# Patient Record
Sex: MALE | Race: WHITE | Employment: FULL TIME | URBAN - NONMETROPOLITAN AREA
[De-identification: names, ages, dates, MRNs, and addresses within clinical notes are randomized per-mention and may not be internally consistent; named-entity substitution may affect disease eponyms.]

---

## 2020-12-01 ENCOUNTER — OFFICE VISIT (OUTPATIENT)
Dept: SURGERY | Age: 65
End: 2020-12-01
Payer: COMMERCIAL

## 2020-12-01 VITALS
SYSTOLIC BLOOD PRESSURE: 133 MMHG | BODY MASS INDEX: 40.23 KG/M2 | HEART RATE: 59 BPM | TEMPERATURE: 98.2 F | WEIGHT: 281 LBS | HEIGHT: 70 IN | DIASTOLIC BLOOD PRESSURE: 84 MMHG

## 2020-12-01 PROCEDURE — 99203 OFFICE O/P NEW LOW 30 MIN: CPT | Performed by: SURGERY

## 2020-12-01 RX ORDER — DEXTROMETHORPHAN HYDROBROMIDE AND PROMETHAZINE HYDROCHLORIDE 15; 6.25 MG/5ML; MG/5ML
SYRUP ORAL 4 TIMES DAILY PRN
COMMUNITY

## 2020-12-01 RX ORDER — MONTELUKAST SODIUM 10 MG/1
10 TABLET ORAL NIGHTLY
COMMUNITY

## 2020-12-01 RX ORDER — OLMESARTAN MEDOXOMIL 40 MG/1
1 TABLET ORAL
COMMUNITY

## 2020-12-01 RX ORDER — HYDROCHLOROTHIAZIDE 25 MG/1
25 TABLET ORAL DAILY
COMMUNITY

## 2020-12-01 RX ORDER — ASPIRIN 81 MG/1
1 TABLET, CHEWABLE ORAL
COMMUNITY

## 2020-12-01 NOTE — PROGRESS NOTES
Subjective   Rashad Salinas is a 60 y.o. male who presents today for evaluation of rectal bleeding. Approximately a week to 2 weeks ago patient had a couple days where he states he felt tired and fatigued. He rested throughout most of these 2days but then the following day had a bowel movement and when he finished having bowel movement he noticed that there was blood in the water as well as blood within the stool and was also having blood with wiping. Over the next couple days he continued to have passage of dark stools and blood per rectum but states that it slowly improved. He does report that this morning he had a bowel movement with no blood noted. Because of the bleeding and the fatigue he did go to the ER for evaluation and had lab work done as well as a CT scan. His lab work showed that his hemoglobin was 13.9 and CT did not show any acute abnormalities. He has since followed up with his PCP and was then referred to general surgery for further evaluation. Patient does have history of diverticulitis and in 2013 underwent partial colectomy for perforated diverticulitis with colostomy. Had reversal 6 months later. Unfortunately he did have abdominal hernia which was repaired and then had recurrence and ended up going to [facility] for abdominal wall reconstruction. Last colonoscopy was in 2012 and per patient report he states that that they did find diverticulosis but no other abnormal findings. Denies any abdominal pain but states that he has had a mild burning sensation in the left lower quadrant for the past couple days. Does not seem to be getting any worse and states that it seems to be slowly improving. No reported family history of colon cancer.     Past Medical History:   Diagnosis Date    Hypertension     Sleep apnea        Past Surgical History:   Procedure Laterality Date    APPENDECTOMY      COLECTOMY  2013    Partial colectomy - at [address] [place] [address]  HERNIA REPAIR      Abdominal reconstruction    KNEE SURGERY         Current Outpatient Medications   Medication Sig Dispense Refill    aspirin 81 MG chewable tablet Take 1 tablet by mouth      hydroCHLOROthiazide (HYDRODIURIL) 25 MG tablet Take 25 mg by mouth daily      olmesartan (BENICAR) 40 MG tablet 1 tablet       No current facility-administered medications for this visit. Allergies   Allergen Reactions    Penicillins        History reviewed. No pertinent family history.     Social History     Socioeconomic History    Marital status: Unknown     Spouse name: Not on file    Number of children: Not on file    Years of education: Not on file    Highest education level: Not on file   Occupational History    Not on file   Social Needs    Financial resource strain: Not on file    Food insecurity     Worry: Not on file     Inability: Not on file    Transportation needs     Medical: Not on file     Non-medical: Not on file   Tobacco Use    Smoking status: Never Smoker    Smokeless tobacco: Never Used   Substance and Sexual Activity    Alcohol use: Not Currently    Drug use: Not Currently    Sexual activity: Not on file   Lifestyle    Physical activity     Days per week: Not on file     Minutes per session: Not on file    Stress: Not on file   Relationships    Social connections     Talks on phone: Not on file     Gets together: Not on file     Attends Orthodox service: Not on file     Active member of club or organization: Not on file     Attends meetings of clubs or organizations: Not on file     Relationship status: Not on file    Intimate partner violence     Fear of current or ex partner: Not on file     Emotionally abused: Not on file     Physically abused: Not on file     Forced sexual activity: Not on file   Other Topics Concern    Not on file   Social History Narrative    Not on file       ROS:   Review of Systems - General ROS: negative  Psychological ROS: negative  Ophthalmic ROS: negative  ENT ROS: negative  Respiratory ROS: no cough, shortness of breath, or wheezing  Cardiovascular ROS: no chest pain or dyspnea on exertion  Gastrointestinal ROS: per HPI  Genito-Urinary ROS: no dysuria, trouble voiding, or hematuria  Musculoskeletal ROS: negative  Dermatological ROS: negative      Objective   Vitals:    12/01/20 1103   BP: 133/84   Pulse: 59   Temp: 98.2 °F (36.8 °C)     General:in no apparent distress and well developed and well nourished  Eyes: No gross abnormalities. Ears, Nose, Throat: hearing grossly normal bilaterally  Neck: neck supple and non tender without mass  Lungs: clear to auscultation without wheezes or rales   Heart: S1S2, no mumurs, RRR  Abdomen: soft, nontender, no HSM, no guarding, no rebound, no masses  Rectal: On visual inspection there is small external skin tags, no fissures or other external abnormalities. On digital exam sphincter tone is intact. No palpable internal hemorrhoids or masses. Extremity: negative  Neuro: CN II-XII grossly intact      Assessment     3  60-year-old male with painless rectal bleeding      Plan     1. At this point no clear cause of his bleeding is immediately identifiable on exam.  We will plan to proceed with colonoscopy for further evaluation. Risk of procedure including bleeding, infection, perforation, need for further surgery and anesthesia risks were discussed and consent is obtained. 2.  Plan for follow-up after procedure based on findings. Patient is advised that should he have any recurrence with continued rectal bleeding does not seem to be stopping to seek immediate medical attention.     Electronically signed by Brent Beatty DO on 12/1/2020 at 11:23 AM      (Please note that portions of this note were completed with a voice recognition program.  Efforts were made to edit the dictations but occasionally words are mis-transcribed.)